# Patient Record
Sex: FEMALE | Race: WHITE
[De-identification: names, ages, dates, MRNs, and addresses within clinical notes are randomized per-mention and may not be internally consistent; named-entity substitution may affect disease eponyms.]

---

## 2017-02-02 ENCOUNTER — HOSPITAL ENCOUNTER (OUTPATIENT)
Dept: HOSPITAL 45 - C.ULTR | Age: 67
Discharge: HOME | End: 2017-02-02
Attending: FAMILY MEDICINE
Payer: COMMERCIAL

## 2017-02-02 DIAGNOSIS — R10.31: ICD-10-CM

## 2017-02-02 DIAGNOSIS — R10.11: ICD-10-CM

## 2017-02-02 DIAGNOSIS — R19.8: Primary | ICD-10-CM

## 2017-02-02 NOTE — DIAGNOSTIC IMAGING REPORT
ABDOMINAL ULTRASOUND COMPLETE



HISTORY: Pain. Nausea.  RT UPPER/LOWER QUAD Pain, pelvic PAIN.



COMPARISON:  1/17/2015



FINDINGS:



Pancreas: The pancreas demonstrates a normal echotexture.



Liver: Mild fatty infiltration



Gallbladder: No gallbladder wall thickening. No gallstones.



CBD: 4 mm



Kidneys: No hydronephrosis.



Spleen: Normal in size.



Aorta: Normal in caliber.



IVC: Patent.



IMPRESSION: 

Mild fatty infiltration of liver. Study is otherwise negative







Electronically signed by:  Kayden Madden M.D.

2/2/2017 12:36 PM



Dictated Date/Time:  2/2/2017 12:34 PM

## 2017-02-02 NOTE — DIAGNOSTIC IMAGING REPORT
ULTRASOUND OF THE PELVIS 



CLINICAL HISTORY: Right pelvic pain.



COMPARISON STUDY: Pelvic CT dated 3/20/2014.



TECHNIQUE: Real-time, grayscale, and color flow sonography of the pelvis is

performed both transabdominally and endovaginally. Images are reviewed in the

transverse and longitudinal planes.



FINDINGS:



Uterus: The uterus is normal in size and echotexture, measuring 5.7 x 2.4 x 3.2

cm.



Endometrium: The endometrium is normal in appearance, and the endometrial stripe

is normal in thickness measuring up to 0.3 cm.



Ovaries: The right ovary is normal in appearance, measuring 2.1 x 1.2 x 1.6 cm.

The left ovary was not visualized. Normal Doppler waveforms are shown within the

right ovary.



Pelvis: There is no free fluid in the cul-de-sac. No concerning adnexal lesion

is seen.





IMPRESSION: 



1. Unremarkable sonographic assessment of the uterus and right ovary.



2. Left ovary was not visualized.







Electronically signed by:  Reg Yo M.D.

2/2/2017 12:40 PM



Dictated Date/Time:  2/2/2017 12:39 PM

## 2017-03-29 ENCOUNTER — HOSPITAL ENCOUNTER (OUTPATIENT)
Dept: HOSPITAL 45 - C.MAMM | Age: 67
Discharge: HOME | End: 2017-03-29
Attending: PHYSICIAN ASSISTANT
Payer: COMMERCIAL

## 2017-03-29 DIAGNOSIS — Z12.31: Primary | ICD-10-CM

## 2017-03-29 NOTE — MAMMOGRAPHY REPORT
BILATERAL DIGITAL SCREENING MAMMOGRAM WITH CAD: 3/29/2017



TECHNIQUE:  Current study was also evaluated with a Computer Aided Detection (CAD) system.  Bilatera
l CC and MLO and XCCL views were obtained.



COMPARISON: Comparison is made to exams dated:  3/1/2016 mammogram, 6/24/2013 mammogram, 1/16/2015 m
ammogram, 1/13/2011 mammogram, and 11/19/2009 mammogram - [facility].   



BREAST COMPOSITION:  The tissue of both breasts is heterogeneously dense, which may obscure small ma
sses.  



FINDINGS:  No suspicious masses, calcifications, or areas of architectural distortion are noted in e
ither breast. There has been no significant interval change compared to prior exams.  





IMPRESSION:  ACR BI-RADS CATEGORY 1: NEGATIVE

There is no mammographic evidence of malignancy. A 1 year screening mammogram is recommended.  The p
atient will receive written notification of the results.  





Approximately 10% of breast cancers are not detected with mammography. A negative mammographic repor
t should not delay biopsy if a clinically suggestive mass is present.



Cici Reynoso M.D.          

ah/:3/29/2017 15:39:14  



Imaging Technologist: Yolanda Shannon RT(R)(M), [facility]

letter sent: Normal 1/2  

BI-RADS Code: ACR BI-RADS Category 1: Negative

## 2018-04-03 ENCOUNTER — HOSPITAL ENCOUNTER (OUTPATIENT)
Dept: HOSPITAL 45 - C.MAMM | Age: 68
Discharge: HOME | End: 2018-04-03
Attending: PHYSICIAN ASSISTANT
Payer: COMMERCIAL

## 2018-04-03 DIAGNOSIS — Z12.31: Primary | ICD-10-CM

## 2018-04-04 NOTE — MAMMOGRAPHY REPORT
BILATERAL DIGITAL SCREENING MAMMOGRAM TOMOSYNTHESIS WITH CAD: 4/3/2018

CLINICAL HISTORY: Routine screening.  





TECHNIQUE:  Breast tomosynthesis in addition to standard 2D mammography was performed. Current study 
was also evaluated with a Computer Aided Detection (CAD) system.  



COMPARISON: Comparison is made to exams dated:  3/29/2017 mammogram, 3/1/2016 mammogram, 1/16/2015 ma
mmogram, 6/24/2013 mammogram, 1/13/2011 mammogram, and 11/19/2009 mammogram - [facility]
enter.   



BREAST COMPOSITION:  The tissue of both breasts is heterogeneously dense, which may obscure small mas
ses.  



FINDINGS:  The parenchymal pattern is unchanged. No developing mass, architectural distortion or clus
ter of suspicious microcalcifications is seen in either breast.  



IMPRESSION:  ACR BI-RADS CATEGORY 2: BENIGN

There is no mammographic evidence of malignancy. A 1 year screening mammogram is recommended.  The pa
tient will receive written notification of the results.  





Approximately 10% of breast cancers are not detected with mammography. A negative mammographic report
 should not delay biopsy if a clinically suggestive mass is present.



Valerie Butt M.D.          

ay/:4/3/2018 15:58:47  



Imaging Technologist: Dilcia JAMES(R)(M), [facility]

letter sent: Normal 1/2  

BI-RADS Code: ACR BI-RADS Category 2: Benign

## 2018-05-21 ENCOUNTER — HOSPITAL ENCOUNTER (OUTPATIENT)
Dept: HOSPITAL 45 - C.MAMM | Age: 68
Discharge: HOME | End: 2018-05-21
Attending: PHYSICIAN ASSISTANT
Payer: COMMERCIAL

## 2018-05-21 DIAGNOSIS — Z13.820: Primary | ICD-10-CM

## 2018-05-21 DIAGNOSIS — M85.88: ICD-10-CM
